# Patient Record
Sex: FEMALE | Employment: UNEMPLOYED | URBAN - METROPOLITAN AREA
[De-identification: names, ages, dates, MRNs, and addresses within clinical notes are randomized per-mention and may not be internally consistent; named-entity substitution may affect disease eponyms.]

---

## 2019-02-11 ENCOUNTER — OFFICE VISIT (OUTPATIENT)
Dept: FAMILY MEDICINE CLINIC | Age: 54
End: 2019-02-11

## 2019-02-11 VITALS
RESPIRATION RATE: 18 BRPM | OXYGEN SATURATION: 95 % | SYSTOLIC BLOOD PRESSURE: 137 MMHG | BODY MASS INDEX: 30.43 KG/M2 | TEMPERATURE: 98.4 F | DIASTOLIC BLOOD PRESSURE: 76 MMHG | HEIGHT: 60 IN | HEART RATE: 76 BPM | WEIGHT: 155 LBS

## 2019-02-11 DIAGNOSIS — E11.9 CONTROLLED TYPE 2 DIABETES MELLITUS WITHOUT COMPLICATION, WITH LONG-TERM CURRENT USE OF INSULIN (HCC): ICD-10-CM

## 2019-02-11 DIAGNOSIS — E78.00 HYPERCHOLESTEREMIA: ICD-10-CM

## 2019-02-11 DIAGNOSIS — I10 ESSENTIAL HYPERTENSION: Primary | ICD-10-CM

## 2019-02-11 DIAGNOSIS — Z79.4 CONTROLLED TYPE 2 DIABETES MELLITUS WITHOUT COMPLICATION, WITH LONG-TERM CURRENT USE OF INSULIN (HCC): ICD-10-CM

## 2019-02-11 DIAGNOSIS — J30.9 ALLERGIC RHINITIS, UNSPECIFIED SEASONALITY, UNSPECIFIED TRIGGER: ICD-10-CM

## 2019-02-11 RX ORDER — GABAPENTIN 300 MG/1
300 CAPSULE ORAL 2 TIMES DAILY
Qty: 60 CAP | Refills: 2 | Status: SHIPPED | OUTPATIENT
Start: 2019-02-11 | End: 2019-05-10 | Stop reason: SDUPTHER

## 2019-02-11 RX ORDER — LISINOPRIL 20 MG/1
20 TABLET ORAL DAILY
Qty: 30 TAB | Refills: 5 | Status: SHIPPED | OUTPATIENT
Start: 2019-02-11 | End: 2019-09-08 | Stop reason: SDUPTHER

## 2019-02-11 RX ORDER — ATORVASTATIN CALCIUM 10 MG/1
10 TABLET, FILM COATED ORAL DAILY
Qty: 30 TAB | Refills: 5 | Status: SHIPPED | OUTPATIENT
Start: 2019-02-11 | End: 2019-08-09 | Stop reason: SDUPTHER

## 2019-02-11 RX ORDER — ATORVASTATIN CALCIUM 10 MG/1
TABLET, FILM COATED ORAL DAILY
COMMUNITY
End: 2019-02-11 | Stop reason: SDUPTHER

## 2019-02-11 RX ORDER — INSULIN GLARGINE 100 [IU]/ML
50 INJECTION, SOLUTION SUBCUTANEOUS DAILY
COMMUNITY
End: 2019-02-11 | Stop reason: CLARIF

## 2019-02-11 RX ORDER — LISINOPRIL 20 MG/1
TABLET ORAL DAILY
COMMUNITY
End: 2019-02-11 | Stop reason: SDUPTHER

## 2019-02-11 RX ORDER — INSULIN GLARGINE 100 [IU]/ML
INJECTION, SOLUTION SUBCUTANEOUS
Qty: 5 PEN | Refills: 5 | Status: SHIPPED | OUTPATIENT
Start: 2019-02-11 | End: 2019-04-10 | Stop reason: SDUPTHER

## 2019-02-11 RX ORDER — CETIRIZINE HCL 10 MG
10 TABLET ORAL DAILY
Qty: 30 TAB | Refills: 5 | Status: SHIPPED | OUTPATIENT
Start: 2019-02-11 | End: 2022-02-04 | Stop reason: SDUPTHER

## 2019-02-11 NOTE — PROGRESS NOTES
HISTORY OF PRESENT ILLNESS  Kina Pacheco is a 48 y.o. female. HPI  Pt in office today to establish care - she does not speak [place] and sister is here as   Pt has concerns about back pain  Pt has concerns about headaches that are bad when her allergies  Pt wants to know if she can get something for the headache and back pain  She used to take zyrtec for allergies, all secretions are clear  -pt used to take percocet       She is also here for follow up chol, htn and DM  She is checking her sugar and 200-300 range is common  Only on lantus - metformin caused diarrhea  invokana gave her terrible yeast infections    The FamHx, SocHx, MedHx, Medication, and Allergy lists have been reviewed and updated in the chart. ROS  A comprehensive review of system was obtained and negative except findings in the HPI    Visit Vitals  /76 (BP 1 Location: Right arm, BP Patient Position: Sitting)   Pulse 76   Temp 98.4 °F (36.9 °C) (Oral)   Resp 18   Ht 5' (1.524 m)   Wt 155 lb (70.3 kg)   SpO2 95%   BMI 30.27 kg/m²     Physical Exam   Constitutional: She is oriented to person, place, and time. She appears well-developed and well-nourished. Neck: No JVD present. Cardiovascular: Normal rate, regular rhythm and intact distal pulses. Exam reveals no gallop and no friction rub. No murmur heard. Pulmonary/Chest: Effort normal and breath sounds normal. No respiratory distress. She has no wheezes. Musculoskeletal: She exhibits no edema. Neurological: She is alert and oriented to person, place, and time. Skin: Skin is warm. Nursing note and vitals reviewed. ASSESSMENT and PLAN  Encounter Diagnoses   Name Primary?     Essential hypertension Yes    Hypercholesteremia     Controlled type 2 diabetes mellitus without complication, with long-term current use of insulin (HCC)     Allergic rhinitis, unspecified seasonality, unspecified trigger      Orders Placed This Encounter    LIPID PANEL    CBC WITH AUTOMATED DIFF    METABOLIC PANEL, COMPREHENSIVE    HEMOGLOBIN A1C WITH EAG    MICROALBUMIN, UR, RAND W/ MICROALB/CREAT RATIO    ALBUTEROL SULFATE PO      mometasone-formoterol (DULERA) 200-5 mcg/actuation HFA inhaler    lisinopril (PRINIVIL, ZESTRIL) 20 mg tablet    atorvastatin (LIPITOR) 10 mg tablet    SITagliptin (JANUVIA) 100 mg tablet    insulin glargine (LANTUS,BASAGLAR) 100 unit/mL (3 mL) inpn    cetirizine (ZYRTEC) 10 mg tablet    gabapentin (NEURONTIN) 300 mg capsule     All refills updated  Given gabapentin for pain bid with food  Restart zyrtec  Also given Januvia  Recheck bs log in 4 weeks    I have discussed the diagnosis with the patient and the intended plan as seen in the above orders. The patient has received an after-visit summary and questions were answered concerning future plans. Patient conveyed understanding of the plan at the time of the visit.     Ruth Bhat, MSN, ANP  2/11/2019

## 2019-02-11 NOTE — PROGRESS NOTES
Chief Complaint   Patient presents with   [address]     Pt in office today to establish care  Pt has concerns about back pain  Pt has concerns about headaches that are bad when her allergies  Pt wants to know if she can get something for the headache and back pain  -pt used to take percocet     Pt has no other concerns

## 2019-02-12 LAB
ALBUMIN SERPL-MCNC: 4.4 G/DL (ref 3.5–5.5)
ALBUMIN/CREAT UR: 18 MG/G CREAT (ref 0–30)
ALBUMIN/GLOB SERPL: 1.4 {RATIO} (ref 1.2–2.2)
ALP SERPL-CCNC: 188 IU/L (ref 39–117)
ALT SERPL-CCNC: 64 IU/L (ref 0–32)
AST SERPL-CCNC: 38 IU/L (ref 0–40)
BASOPHILS # BLD AUTO: 0 X10E3/UL (ref 0–0.2)
BASOPHILS NFR BLD AUTO: 0 %
BILIRUB SERPL-MCNC: 1.5 MG/DL (ref 0–1.2)
BUN SERPL-MCNC: 11 MG/DL (ref 6–24)
BUN/CREAT SERPL: 15 (ref 9–23)
CALCIUM SERPL-MCNC: 10.2 MG/DL (ref 8.7–10.2)
CHLORIDE SERPL-SCNC: 100 MMOL/L (ref 96–106)
CHOLEST SERPL-MCNC: 204 MG/DL (ref 100–199)
CO2 SERPL-SCNC: 24 MMOL/L (ref 20–29)
CREAT SERPL-MCNC: 0.73 MG/DL (ref 0.57–1)
CREAT UR-MCNC: 55.5 MG/DL
EOSINOPHIL # BLD AUTO: 0.3 X10E3/UL (ref 0–0.4)
EOSINOPHIL NFR BLD AUTO: 3 %
ERYTHROCYTE [DISTWIDTH] IN BLOOD BY AUTOMATED COUNT: 13.8 % (ref 12.3–15.4)
EST. AVERAGE GLUCOSE BLD GHB EST-MCNC: 229 MG/DL
GLOBULIN SER CALC-MCNC: 3.1 G/DL (ref 1.5–4.5)
GLUCOSE SERPL-MCNC: 335 MG/DL (ref 65–99)
HBA1C MFR BLD: 9.6 % (ref 4.8–5.6)
HCT VFR BLD AUTO: 40.6 % (ref 34–46.6)
HDLC SERPL-MCNC: 49 MG/DL
HGB BLD-MCNC: 13.8 G/DL (ref 11.1–15.9)
IMM GRANULOCYTES # BLD AUTO: 0 X10E3/UL (ref 0–0.1)
IMM GRANULOCYTES NFR BLD AUTO: 0 %
INTERPRETATION, 910389: NORMAL
LDLC SERPL CALC-MCNC: 108 MG/DL (ref 0–99)
LYMPHOCYTES # BLD AUTO: 3.4 X10E3/UL (ref 0.7–3.1)
LYMPHOCYTES NFR BLD AUTO: 37 %
Lab: NORMAL
MCH RBC QN AUTO: 28.2 PG (ref 26.6–33)
MCHC RBC AUTO-ENTMCNC: 34 G/DL (ref 31.5–35.7)
MCV RBC AUTO: 83 FL (ref 79–97)
MICROALBUMIN UR-MCNC: 10 UG/ML
MONOCYTES # BLD AUTO: 0.4 X10E3/UL (ref 0.1–0.9)
MONOCYTES NFR BLD AUTO: 4 %
NEUTROPHILS # BLD AUTO: 4.9 X10E3/UL (ref 1.4–7)
NEUTROPHILS NFR BLD AUTO: 56 %
PLATELET # BLD AUTO: 277 X10E3/UL (ref 150–379)
POTASSIUM SERPL-SCNC: 4.3 MMOL/L (ref 3.5–5.2)
PROT SERPL-MCNC: 7.5 G/DL (ref 6–8.5)
RBC # BLD AUTO: 4.89 X10E6/UL (ref 3.77–5.28)
SODIUM SERPL-SCNC: 139 MMOL/L (ref 134–144)
TRIGL SERPL-MCNC: 234 MG/DL (ref 0–149)
VLDLC SERPL CALC-MCNC: 47 MG/DL (ref 5–40)
WBC # BLD AUTO: 9 X10E3/UL (ref 3.4–10.8)

## 2019-02-12 NOTE — PROGRESS NOTES
Please let her know that her labs are all quite high for sugar and cholesterol. Start the Januvia as prescribed and recheck labs in 3 months.  [facility], [place]

## 2019-02-13 NOTE — PROGRESS NOTES
Spoke with pt  David Butts (on hippa) and informed him of his wifes results. David Butts verbalized understanding and stated he would pass the message along to her and to recheck in 3months.

## 2019-04-10 ENCOUNTER — TELEPHONE (OUTPATIENT)
Dept: FAMILY MEDICINE CLINIC | Age: 54
End: 2019-04-10

## 2019-04-10 RX ORDER — INSULIN GLARGINE 100 [IU]/ML
INJECTION, SOLUTION SUBCUTANEOUS
Qty: 5 PEN | Refills: 5 | Status: SHIPPED | OUTPATIENT
Start: 2019-04-10 | End: 2022-02-04 | Stop reason: SDUPTHER

## 2019-04-10 NOTE — TELEPHONE ENCOUNTER
Pt is out of insulin and will be traveling to [facility] 80 states she has tried reaching out to [employer]

## 2019-04-10 NOTE — TELEPHONE ENCOUNTER
----- Message from Yeni Andrade sent at 4/10/2019 10:55 AM EDT -----  Regarding: Dr. Gerber Adan Refill  Patient states she has already contacted [facility] pharmacy (already on file) to fax over a refill request for her insulin but has received no response. Patient states she is currently out of insulin. Pt's best contact number is [phone].

## 2019-05-10 RX ORDER — GABAPENTIN 300 MG/1
300 CAPSULE ORAL 2 TIMES DAILY
Qty: 60 CAP | Refills: 2 | Status: SHIPPED | OUTPATIENT
Start: 2019-05-10 | End: 2021-11-19

## 2019-08-09 RX ORDER — ATORVASTATIN CALCIUM 10 MG/1
TABLET, FILM COATED ORAL
Qty: 30 TAB | Refills: 5 | Status: SHIPPED | OUTPATIENT
Start: 2019-08-09 | End: 2021-12-24 | Stop reason: SDUPTHER

## 2019-09-09 RX ORDER — LISINOPRIL 20 MG/1
TABLET ORAL
Qty: 30 TAB | Refills: 5 | Status: SHIPPED | OUTPATIENT
Start: 2019-09-09 | End: 2021-11-01 | Stop reason: SDUPTHER

## 2021-06-11 ENCOUNTER — OFFICE VISIT (OUTPATIENT)
Dept: FAMILY MEDICINE CLINIC | Age: 56
End: 2021-06-11
Payer: COMMERCIAL

## 2021-06-11 VITALS
HEIGHT: 61 IN | OXYGEN SATURATION: 97 % | WEIGHT: 150 LBS | HEART RATE: 70 BPM | BODY MASS INDEX: 28.32 KG/M2 | TEMPERATURE: 98.1 F | DIASTOLIC BLOOD PRESSURE: 71 MMHG | RESPIRATION RATE: 18 BRPM | SYSTOLIC BLOOD PRESSURE: 110 MMHG

## 2021-06-11 DIAGNOSIS — Z79.4 TYPE 2 DIABETES MELLITUS WITHOUT COMPLICATION, WITH LONG-TERM CURRENT USE OF INSULIN (HCC): Primary | ICD-10-CM

## 2021-06-11 DIAGNOSIS — M79.602 LEFT ARM PAIN: ICD-10-CM

## 2021-06-11 DIAGNOSIS — I10 ESSENTIAL HYPERTENSION: ICD-10-CM

## 2021-06-11 DIAGNOSIS — G56.02 CARPAL TUNNEL SYNDROME, LEFT: ICD-10-CM

## 2021-06-11 DIAGNOSIS — E11.9 TYPE 2 DIABETES MELLITUS WITHOUT COMPLICATION, WITH LONG-TERM CURRENT USE OF INSULIN (HCC): Primary | ICD-10-CM

## 2021-06-11 PROCEDURE — 99204 OFFICE O/P NEW MOD 45 MIN: CPT | Performed by: NURSE PRACTITIONER

## 2021-06-11 RX ORDER — GABAPENTIN 300 MG/1
CAPSULE ORAL
Qty: 90 CAPSULE | Refills: 1 | Status: SHIPPED | OUTPATIENT
Start: 2021-06-11 | End: 2021-07-16

## 2021-06-11 RX ORDER — LISINOPRIL 20 MG/1
TABLET ORAL DAILY
COMMUNITY
End: 2021-07-16

## 2021-06-11 RX ORDER — BUTALBITAL, ASPIRIN AND CAFFEINE 50; 325; 40 MG/1; MG/1; MG/1
1 TABLET ORAL
COMMUNITY
End: 2021-07-16

## 2021-06-11 RX ORDER — MONTELUKAST SODIUM 10 MG/1
10 TABLET ORAL DAILY
COMMUNITY
End: 2021-11-19

## 2021-06-11 RX ORDER — PREDNISONE 10 MG/1
TABLET ORAL
Qty: 21 TABLET | Refills: 0 | Status: SHIPPED | OUTPATIENT
Start: 2021-06-11 | End: 2021-07-16

## 2021-06-11 RX ORDER — CETIRIZINE HCL 10 MG
TABLET ORAL
COMMUNITY
End: 2021-07-16

## 2021-06-11 RX ORDER — INSULIN GLARGINE 100 [IU]/ML
INJECTION, SOLUTION SUBCUTANEOUS
COMMUNITY
End: 2021-07-16

## 2021-06-11 RX ORDER — IPRATROPIUM BROMIDE 21 UG/1
2 SPRAY, METERED NASAL EVERY 12 HOURS
COMMUNITY
End: 2021-11-19

## 2021-06-11 RX ORDER — DULAGLUTIDE 0.75 MG/.5ML
0.75 INJECTION, SOLUTION SUBCUTANEOUS
COMMUNITY
End: 2021-07-02 | Stop reason: SDUPTHER

## 2021-06-11 RX ORDER — FLUTICASONE PROPIONATE 50 MCG
2 SPRAY, SUSPENSION (ML) NASAL DAILY
COMMUNITY

## 2021-06-11 RX ORDER — ALBUTEROL SULFATE 90 UG/1
AEROSOL, METERED RESPIRATORY (INHALATION)
COMMUNITY

## 2021-06-11 RX ORDER — TOPIRAMATE 50 MG/1
TABLET, FILM COATED ORAL 2 TIMES DAILY
COMMUNITY
End: 2021-07-16

## 2021-06-11 NOTE — PROGRESS NOTES
Chief Complaint   Patient presents with   [address] [facility]    Patient in office today to [facility]. Pt previous pcp was Lindsey Finn in [place]      Patient have c/o of left shoulder pain that began in December after covid. Pt states pain is a shooting and stabbing sensation,will radiate down arm. Denies injury or heavy lifting. Pt have noted limited ROM-only able to lift arm at a certain height. Have been treating with Advil and Tylenol-with no relief noted. 1. Have you been to the ER, urgent care clinic since your last visit? Hospitalized since your last visit? No    2. Have you seen or consulted any other health care providers outside of the [address] [place] [address] since your last visit? Include any pap smears or colon screening.  No

## 2021-06-11 NOTE — PATIENT INSTRUCTIONS
Síndrome del sia carpuzma: Instrucciones de cuidado  Carpal Tunnel Syndrome: Care Instructions  Instrucciones de cuidado    El síndrome del sia carpuzma es un problema nervioso. Puede causar hormigueo, entumecimiento, debilidad o [employer] dedos, el pulgar y la [place]. El nervio mediano y varios tejidos fibrosos, llamados tendones, atraviesan la nicolás por un espacio denominado sia carpiano. El movimiento repetido de las hitesh al trabajar o practicar ciertos pasatiempos y deportes puede hacer presión sobre el nervio. El embarazo y ciertas afecciones médicas, cabrera la diabetes, la artritis y Tonye Rubi tiroides hipoactiva, también pueden causar el síndrome del sia carpuzma. Para reducir los síntomas, usted podría limitar [place] o [facility] Center[facility] de [place]. También puede kaden otras medidas para sentirse mejor. Si los síntomas son leves, es probable que pueda aliviar el dolor con 1 o 2 semanas de tratamiento en el [facility]. La cirugía es necesaria solo si otros tratamientos no funcionan. La atención de seguimiento es romain parte clave de hayward tratamiento y seguridad. Asegúrese de hacer y acudir a todas las citas, y llame a hayward médico si está teniendo problemas. También es romain buena idea saber los resultados de nikolay exámenes y mantener romain lista de los medicamentos que marck. Cómo puede cuidarse en el hogar? · Si es posible, interrumpa o disminuya la actividad que causa los síntomas. Si no puede suspenderla, eladia pausas frecuentes para descansar y estirarse o cambie la posición de las hitesh para hacer romain tarea. Trate de [place] [facility], cabrera cuando [place] el ratón de romain computadora. · Trate de evitar doblar o rotar las muñecas. · Pregúntele a hayward médico si puede kaden un analgésico (medicamento para el dolor) de venta bekah, cabrera acetaminofén (Tylenol), ibuprofeno (Advil, Motrin) o naproxeno (Aleve). Sea vidhi con los medicamentos. Chloe y siga todas las instrucciones de la [place].   · Si hayward médico le receta corticosteroides para ayudar a aliviar el dolor y reducir la hinchazón, tómelos exactamente cabrera le fueron recetados. Llame a hayward médico si conrad estar teniendo problemas con hayward medicamento. · Colóquese hielo o ormain compresa fría sobre la [place] de 10 a 20 minutos cada vez para aliviar el dolor. Póngase un paño vallejo entre el hielo y la piel. · Si hayward médico o hayward fisioterapeuta o terapeuta ocupacional le indica que use romain tablilla (férula) para la [place], [place] según las indicaciones para mantener la [place] en romain posición neutra. Leona también reduce la presión sobre hayward nervio mediano. · Pregúntele a hayward médico si debería hacer sesiones de fisioterapia o de terapia ocupacional para aprender a hacer las tareas de [employer]. · [place] clases de yoga para estirar los músculos y fortalecer las hitesh y las [place]. El yoga ha [employer] síntomas del túnel carpiano. Cómo prevenir el síndrome del túnel carpiano  · Cuando trabaje en la computadora, Carie Valdez [address] y las muñecas alineadas con los antebrazos. Mantenga los codos cerca de nikolay costados. [place] un descanso con romain frecuencia de 10 a 15 minutos. · Trate de hacer los siguientes ejercicios:  ? Calentamiento: Gire la [place], Selena Adeola y de un lado a otro. Repita esto 4 veces. Estire [employer] dedos, relájelos y vuelva a estirarlos. Repita 4 veces. Estire el pulgar doblándolo levemente hacia atrás, manténgalo en aye posición y relájelo. Repita 4 veces. ? Estiramiento con los [employer] en posición de orar: Comience colocando las toya de las hitesh juntas racquel del [place], jesusita debajo del [place]. Baje las hitesh lentamente hacia la línea de la cintura y manténgalas cerca del estómago con las toya juntas hasta que sienta un estiramiento leve a moderado debajo de los antebrazos. Mantenga la posición entre 10 y 21 segundos. Repita 4 veces. ? Estiramiento del flexor de la nicolás: Extienda el Jose Levans frente a usted, con la taveras [place] arriba. Doble la nicolás y apunte con la mano hacia el piso. Con la [employer], doble con suavidad la nicolás aún más hasta que sienta un estiramiento entre leve y moderado en el antebrazo. Mantenga la posición entre 10 y 21 segundos. Repita 4 veces. ? Estiramiento del extensor de la nicolás: Repita los pasos para el estiramiento del flexor de la nicolás sina comience con la taveras extendida Iris Rafael. · Apriete romain pelota de goma varias veces al día para mantener marcelina las hitesh y los dedos. · Evite sostener objetos (cabrera un libro) en la misma posición sravanthi mucho tiempo. Siempre que sea posible, utilice toda la mano para kaden un objeto. Si [place] solo el pulgar y el dedo índice puede tensionar la [place]. · No fume. Puede empeorar esta afección ya que reduce el flujo de darell hacia el nervio El paso. Si necesita ayuda para dejar de fumar, hable con hayward médico sobre programas y medicamentos para dejar de fumar. Estos pueden aumentar nikolay probabilidades de dejar el hábito para siempre. Cuándo debe pedir ayuda? Preste especial atención a los cambios de hayward yoni y asegúrese de comunicarse con hayward médico si:    · El dolor u otros problemas no mejoran con los cuidados en el hogar.     · Desea más información sobre la fisioterapia o la terapia ocupacional.     · Tiene efectos secundarios producidos por los corticosteroides, tales cabrera:  ? Aumento de [place]. ? Cambios en el estado de ánimo. ? Problemas para dormir. ? Fácil formación de moretones.     · Tiene otros problemas con los medicamentos. Dónde puede encontrar más información en inglés? Vaya a [email or web address]  Micha R432 en la búsqueda para aprender más acerca de \"Síndrome del sia walls: Instrucciones de cuidado. \"  Revisado: 16 noviembre, 2020               Versión del contenido: 12.8  © 7222-8382 Healthwise, Incorporated.    Las instrucciones de cuidado fueron adaptadas bajo licencia por [employer] (which disclaims liability or warranty for this information). Si usted tiene [place] afección médica o sobre estas instrucciones, siempre pregunte a guerra profesional de yoni. [employer] niega toda garantía o responsabilidad por guerra uso de esta información. Síndrome del sia walls: Ejercicios  Carpal Tunnel Syndrome: Exercises  Instrucciones de cuidado  Aquí se presentan algunos ejemplos de ejercicios típicos de rehabilitación para tratar guerra afección. Empiece cada ejercicio lentamente. Reduzca la intensidad del ejercicio si Conchis Leys a tener dolor. Guerra médico o guerra fisioterapeuta o terapeuta ocupacional le dirá cuándo puede comenzar con estos ejercicios y cuáles funcionarán mejor para usted. Estiramientos de calentamiento  Nota: Cuando ya no tenga dolor o entumecimiento, puede hacer ejercicios para ayudar a prevenir que vuelva el síndrome del sia walls. No eladia ningún estiramiento o movimiento que sea incómodo o doloroso. Estiramientos de calentamiento  · Gire la nicolás hacia arriba, [place] y de un lado a otro. Repita 4 veces. · Estire los dedos separándolos. Relájelos y luego vuelva a estirarlos. Repita 4 veces. · Estire el pulgar doblándolo levemente hacia atrás, manténgalo en aye posición y relájelo. Repita 4 veces. Cómo hacer los ejercicios  Estiramiento con los brazos en posición de orar   1. Comience colocando las toya de las hitesh juntas racquel del [place], jesusita debajo del [place]. 2. Baje las hitesh lentamente hacia la línea de la cintura y manténgalas cerca del estómago con las toya juntas hasta que sienta un estiramiento leve a moderado debajo de los antebrazos. 3. Sosténgalo por lo menos de 15 a 30 segundos. Repita de 2 a 4 veces. Estiramiento del flexor de la nicolás   1. Extienda el brazo racquel de usted con la taveras [place] arriba. 2. Doble la nicolás y apunte con la mano hacia el piso.   3. Con la otra mano, doble con suavidad la nicolás aún más hasta que sienta un estiramiento entre leve y [employer]. 4. Sosténgalo por lo menos de 15 a 30 segundos. Repita de 2 a 4 veces. Estiramiento del extensor de la nicolás   1. Repita los pasos del 1 al 4 del estiramiento anterior, sina comience con la taveras de la mano extendida [place]. La atención de seguimiento es romain parte clave de hayward tratamiento y seguridad. Asegúrese de hacer y acudir a todas las citas, y llame a hayward médico si está teniendo problemas. También es romain buena idea saber los resultados de nikolay exámenes y mantener romain lista de los medicamentos que marck. Dónde puede encontrar más información en inglés? Poonam Kenney a [email or web address]  Matti Arroyo D162 en la búsqueda para aprender más acerca de \"Síndrome del sia walls: Ejercicios. \"  Revisado: 16 noviembre, 2020               Versión del contenido: 12.8  © 1686-1415 Healthwise, [employer]. Las instrucciones de cuidado fueron adaptadas bajo licencia por [employer] (which disclaims liability or warranty for this information). Si usted tiene [place] afección médica o sobre estas instrucciones, siempre pregunte a hayward profesional de yoni. [employer], [employer] niega toda garantía o responsabilidad por hayward uso de esta información.

## 2021-06-11 NOTE — PROGRESS NOTES
Chief Complaint   Patient presents with   [address]    Patient in office today to [facility]. Pt previous pcp was Dr. Joan Ferreira in [place]. Last saw in January. Last hemoglobin a1c check was in January and it was 9.5. She increased the basaglar to 68 and started pt on trulicity once weekly. Checking BG at home. Depends on what she eats. Sometimes doesn't require as much of the insulin. Was noticing some low readings with the higher dose. Checking every morning and readings usually good. This morning was 88. Last meal was just prior to appt. Patient have c/o of left shoulder pain that began in December after covid. PCP recommended yoga which was not helping. Pain is constant. Decreased ROM due to pain. Pointing to the left shoulder and radiates down to the fingers. Has associated numbness and tingling in the fingers. Does have some associated neck pain. Pt states pain is a shooting and stabbing sensation, will radiate down arm. Denies injury or heavy lifting. Pt have noted limited ROM-only able to lift arm at a certain height. Have been treating with Advil and Tylenol-with no relief noted. Has also applied heat without relief. Was hospitalized for COVID for 6 days. Pt denies any of these sx prior to COVID. Denies any intubation. Just treated for pneumonia any hypoxia. Tried using a muscle relaxer (not flexeril) that didn't help and made her drowsy. Denies any other concerns at this time. Chief Complaint   Patient presents with   [address]     she is a 50y.o. year old female who presents for evalution. Reviewed PmHx, RxHx, FmHx, SocHx, AllgHx and updated and dated in the chart.     Review of Systems - negative except as listed above in the HPI    Objective:     Vitals:    06/11/21 1349   BP: 110/71   Pulse: 70   Resp: 18   Temp: 98.1 °F (36.7 °C)   TempSrc: Oral   SpO2: 97%   Weight: 150 lb (68 kg)   Height: 5' 0.83\" (1.545 m)     Physical Examination: General appearance - alert, well appearing, and in no distress  Mental status - normal mood, behavior, speech, dress, motor activity, and thought processes  Eyes - pupils equal and reactive, extraocular eye movements intact  Ears - bilateral TM's and external ear canals normal  Nose - normal and patent, no erythema, discharge or polyps and normal nontender sinuses  Mouth - mucous membranes moist, pharynx normal without lesions  Neck - supple, no significant adenopathy, carotids upstroke normal bilaterally, no bruits, thyroid exam: thyroid is normal in size without nodules or tenderness  Chest - clear to auscultation, no wheezes, rales or rhonchi, symmetric air entry  Heart - normal rate, regular rhythm, normal S1, S2, no murmurs  Musculoskeletal - neck ROM intact, abnormal exam of left shoulder, reports pain with all ROM that radiates from shoulder down to arm  Positive tinnels and phalens in left wrist  Extremities - peripheral pulses normal, no ankle edema, no clubbing or cyanosis  Skin - normal coloration and turgor, no rashes, no suspicious skin lesions noted    Assessment/ Plan:   Diagnoses and all orders for this visit:    1. Type 2 diabetes mellitus without complication, with long-term current use of insulin ([facility])  -     HEMOGLOBIN A1C WITH EAG; Future  -     METABOLIC PANEL, COMPREHENSIVE; Future  -     CBC WITH AUTOMATED DIFF; Future  Will notify results and deviate plan based on findings. Will request records from previous PCP to review. 2. Essential hypertension  BP at goal on med regimen. 3. Left arm pain / 4. Carpal tunnel syndrome, left  -     gabapentin (NEURONTIN) 300 mg capsule; Take 2 caps by mouth in the evening and 1 cap by mouth in the morning.  -     predniSONE (STERAPRED DS) 10 mg dose pack; See administration instruction per 10mg dose pack  Recommended trial of medication. Complete pred taper as prescribed. Low carb diet and lots of water.  If sugars become elevated, okay to take extra basaglar to cover. Call if assistance needed troubleshooting but pt seemed confident that she can manage. Gabapentin as directed. Reviewed SEs/ADRs of medication. Reviewed diagnosis and recommended stretches. Follow up in 4 weeks to recheck. If sx persist or worsen will order xray of C spine and shoulder for further eval.      Follow-up and Dispositions    · Return in about 4 weeks (around 7/9/2021) for follow up. I have discussed the diagnosis with the patient and the intended plan as seen in the above orders. The patient has received an after-visit summary and questions were answered concerning future plans. Medication Side Effects and Warnings were discussed with patient: yes  Patient Labs were reviewed and or requested: yes  Patient Past Records were reviewed and or requested  yes  Patient / Caregiver Understanding of treatment plan was verbalized during office visit TRAY Nazario-ERLIN    Patient Instructions          Síndrome del túnel carpiano: Instrucciones de cuidado  Carpal Tunnel Syndrome: Care Instructions  Instrucciones de cuidado    El síndrome del túnel carpiano es un problema nervioso. Puede causar hormigueo, entumecimiento, debilidad o [employer] dedos, el pulgar y la [place]. El nervio mediano y varios tejidos fibrosos, llamados tendones, atraviesan la nicolás por un espacio denominado túnel carpiano. El movimiento repetido de las hitesh al trabajar o practicar ciertos pasatiempos y deportes puede hacer presión sobre el nervio. El embarazo y ciertas afecciones médicas, cabrera la diabetes, la artritis y Eder Saldana tiroides hipoactiva, también pueden causar el síndrome del túnel carpiano. Para reducir los síntomas, usted podría limitar [facility] o [facility]. También puede kaden otras medidas para sentirse mejor. Si los síntomas son leves, es probable que pueda aliviar el dolor con 1 o 2 semanas de tratamiento en el hogar.  La cirugía es necesaria solo si otros tratamientos no funcionan. La atención de seguimiento es romain parte clave de hayward tratamiento y seguridad. Asegúrese de hacer y acudir a todas las citas, y llame a hayward médico si está teniendo problemas. También es romain buena idea saber los resultados de nikolay exámenes y mantener romain lista de los medicamentos que marck. ¿Cómo puede cuidarse en el hogar? · Si es posible, interrumpa o disminuya la actividad que causa los síntomas. Si no puede suspenderla, eladia pausas frecuentes para descansar y estirarse o cambie la posición de las hitesh para hacer romain tarea. Trate de [place] [facility], cabrera cuando [place] el ratón de romain computadora. · Trate de evitar doblar o rotar las muñecas. · Pregúntele a hayward médico si puede kaden un analgésico (medicamento para el dolor) de venta bekah, cabrera acetaminofén (Tylenol), ibuprofeno (Advil, Motrin) o naproxeno (Aleve). Sea vidhi con los medicamentos. Chloe y siga todas las instrucciones de la [place]. · Si hayward médico le receta corticosteroides para ayudar a aliviar el dolor y reducir la hinchazón, tómelos exactamente cabrera le fueron recetados. Llame a hayward médico si conrad estar teniendo problemas con hayward medicamento. · Colóquese hielo o romain compresa fría sobre la [place] de 10 a 20 minutos cada vez para aliviar el dolor. Póngase un paño vallejo entre el hielo y la piel. · Si hayward médico o hayward fisioterapeuta o terapeuta ocupacional le indica que use romain tablilla (férula) para la [place], [place] según las indicaciones para mantener la [place] en romain posición neutra. [place] también reduce la presión sobre hayward nervio mediano. · Pregúntele a hayward médico si debería hacer sesiones de fisioterapia o de terapia ocupacional para aprender a hacer las tareas de [employer]. · Hoople clases de yoga para estirar los músculos y fortalecer las hitesh y las Mad[place]. El yoga ha [employer] síntomas del sia walls.   Cómo prevenir el síndrome del túnel titi  · Cuando trabaje en la computadora, Gabriella. [address] y las muñecas alineadas con los antebrazos. Mantenga los codos cerca de nikolay costados. [place] un descanso con romain frecuencia de 10 a 15 minutos. · Trate de hacer los siguientes ejercicios:  ? Calentamiento: Gire la [place], Carmen Alden y de un lado a otro. Repita esto 4 veces. Estire [employer] dedos, relájelos y vuelva a estirarlos. Repita 4 veces. Estire el pulgar doblándolo levemente hacia atrás, manténgalo en aye posición y relájelo. Repita 4 veces. ? Estiramiento con los [employer] en posición de orar: Comience colocando las toya de las hitesh juntas racquel del [place], jesusita debajo del [place]. Baje las hitesh lentamente hacia la línea de la cintura y manténgalas cerca del estómago con las toya juntas hasta que sienta un estiramiento leve a moderado debajo de los antebrazos. Mantenga la posición entre 10 y 21 segundos. Repita 4 veces. ? Estiramiento del flexor de la nicolás: Extienda el Connor Grippe frente a usted, con la taveras [place] arriba. Doble la nicolás y apunte con la mano hacia el piso. Con la [employer], doble con suavidad la nicolás aún más hasta que sienta un estiramiento entre leve y moderado en el antebrazo. Mantenga la posición entre 10 y 21 segundos. Repita 4 veces. ? Estiramiento del extensor de la nicolás: Repita los pasos para el estiramiento del flexor de la nicolás sina comience con la taveras extendida Carmen Alden. · Apriete romain pelota de goma varias veces al día para mantener marcelina las hitesh y los dedos. · Evite sostener objetos (cabrera un libro) en la misma posición sravanthi mucho tiempo. Siempre que sea posible, utilice toda la mano para kaden un objeto. Si [place] solo el pulgar y el dedo índice puede tensionar la [place]. · No fume. Puede empeorar esta afección ya que reduce el flujo de darell hacia el nervio El paso. Si necesita ayuda para dejar de fumar, hable con hayward médico sobre programas y medicamentos para dejar de fumar.  Estos pueden aumentar nikolay probabilidades de dejar el hábito para siempre. ¿Cuándo debe pedir ayuda? Preste especial atención a los cambios de hayward yoni y asegúrese de comunicarse con hayward médico si:    · El dolor u otros problemas no mejoran con los cuidados en el hogar.     · Desea más información sobre la fisioterapia o la terapia ocupacional.     · Tiene efectos secundarios producidos por los corticosteroides, tales cabrera:  ? Aumento de [place]. ? Cambios en el estado de ánimo. ? Problemas para dormir. ? Fácil formación de moretones.     · Tiene otros problemas con los medicamentos. ¿Dónde puede encontrar más información en inglés? Vaya a http://www.gray.com/  Micha R432 en la búsqueda para aprender más acerca de \"Síndrome del túnel carpiano: Instrucciones de cuidado. \"  Revisado: 16 noviembre, 2020               Versión del contenido: 12.8  © 2102-8524 Healthwise, Incorporated. Las instrucciones de cuidado fueron adaptadas bajo licencia por [employer] (which disclaims liability or warranty for this information). Si usted tiene [place] afección médica o sobre estas instrucciones, siempre pregunte a hayward profesional de yoni. Healthwise, Incorporated niega toda garantía o responsabilidad por hayward uso de esta información. Síndrome del túnel carpiano: Ejercicios  Carpal Tunnel Syndrome: Exercises  Instrucciones de cuidado  Aquí se presentan algunos ejemplos de ejercicios típicos de rehabilitación para tratar hayward afección. Empiece cada ejercicio lentamente. Reduzca la intensidad del ejercicio si Lashawn Daria a tener dolor. Hayward médico o hayward fisioterapeuta o terapeuta ocupacional le dirá cuándo puede comenzar con estos ejercicios y cuáles funcionarán mejor para usted. Estiramientos de calentamiento  Nota: Cuando ya no tenga dolor o entumecimiento, puede hacer ejercicios para ayudar a prevenir que vuelva el síndrome del túnel carpiano. No eladia ningún estiramiento o movimiento que sea incómodo o doloroso.   Estiramientos de calentamiento  · Gire la nicolás hacia arriba, [place] y de un lado a otro. Repita 4 veces. · Estire los dedos separándolos. Relájelos y luego vuelva a estirarlos. Repita 4 veces. · Estire el pulgar doblándolo levemente hacia atrás, manténgalo en aye posición y relájelo. Repita 4 veces. Cómo hacer los ejercicios  Estiramiento con los brazos en posición de orar   1. Comience colocando las toya de las hitesh juntas racquel del [place], jesusita debajo del [place]. 2. Baje las hitesh lentamente hacia la línea de la cintura y manténgalas cerca del estómago con las toya juntas hasta que sienta un estiramiento leve a moderado debajo de los antebrazos. 3. Sosténgalo por lo menos de 15 a 30 segundos. Repita de 2 a 4 veces. Estiramiento del flexor de la nicolás   1. Extienda el brazo racquel de usted con la taveras [place] arriba. 2. Doble la nicolás y apunte con la mano hacia el piso. 3. Con la otra mano, doble con suavidad la nicolás aún más hasta que sienta un estiramiento entre leve y moderado en el antebrazo. 4. Sosténgalo por lo menos de 15 a 30 segundos. Repita de 2 a 4 veces. Estiramiento del extensor de la nicolás   1. Repita los pasos del 1 al 4 del estiramiento anterior, sina comience con la taveras de la mano extendida [place] Ing. La atención de seguimiento es romain parte clave de hayward tratamiento y seguridad. Asegúrese de hacer y acudir a todas las citas, y llame a hayward médico si está teniendo problemas. También es romain buena idea saber los resultados de nikolay exámenes y mantener romain lista de los medicamentos que marck. ¿Dónde puede encontrar más información en inglés? Rachel Class a http://www.gray.com/  Micha R299 en la búsqueda para aprender más acerca de \"Síndrome del túnel carpiano: Ejercicios. \"  Revisado: 16 noviembre, 2020               Versión del contenido: 12.8  © 7282-5378 Healthwise, Incorporated.    Las instrucciones de cuidado fueron adaptadas bajo licencia por [employer] (which disclaims liability or warranty for this information). Si usted tiene [place] afección médica o sobre estas instrucciones, siempre pregunte a hayward profesional de yoni. [facility] niega toda garantía o responsabilidad por hayward uso de esta información.

## 2021-06-12 LAB
ALBUMIN SERPL-MCNC: 4.5 G/DL (ref 3.8–4.9)
ALBUMIN/GLOB SERPL: 1.7 {RATIO} (ref 1.2–2.2)
ALP SERPL-CCNC: 144 IU/L (ref 48–121)
ALT SERPL-CCNC: 41 IU/L (ref 0–32)
AST SERPL-CCNC: 24 IU/L (ref 0–40)
BASOPHILS # BLD AUTO: 0.1 X10E3/UL (ref 0–0.2)
BASOPHILS NFR BLD AUTO: 1 %
BILIRUB SERPL-MCNC: 1.8 MG/DL (ref 0–1.2)
BUN SERPL-MCNC: 12 MG/DL (ref 6–24)
BUN/CREAT SERPL: 19 (ref 9–23)
CALCIUM SERPL-MCNC: 9.6 MG/DL (ref 8.7–10.2)
CHLORIDE SERPL-SCNC: 103 MMOL/L (ref 96–106)
CO2 SERPL-SCNC: 22 MMOL/L (ref 20–29)
CREAT SERPL-MCNC: 0.64 MG/DL (ref 0.57–1)
EOSINOPHIL # BLD AUTO: 0.5 X10E3/UL (ref 0–0.4)
EOSINOPHIL NFR BLD AUTO: 5 %
ERYTHROCYTE [DISTWIDTH] IN BLOOD BY AUTOMATED COUNT: 13.3 % (ref 11.7–15.4)
EST. AVERAGE GLUCOSE BLD GHB EST-MCNC: 157 MG/DL
GLOBULIN SER CALC-MCNC: 2.7 G/DL (ref 1.5–4.5)
GLUCOSE SERPL-MCNC: 171 MG/DL (ref 65–99)
HBA1C MFR BLD: 7.1 % (ref 4.8–5.6)
HCT VFR BLD AUTO: 43 % (ref 34–46.6)
HGB BLD-MCNC: 14.6 G/DL (ref 11.1–15.9)
IMM GRANULOCYTES # BLD AUTO: 0 X10E3/UL (ref 0–0.1)
IMM GRANULOCYTES NFR BLD AUTO: 0 %
LYMPHOCYTES # BLD AUTO: 4.1 X10E3/UL (ref 0.7–3.1)
LYMPHOCYTES NFR BLD AUTO: 37 %
MCH RBC QN AUTO: 29.1 PG (ref 26.6–33)
MCHC RBC AUTO-ENTMCNC: 34 G/DL (ref 31.5–35.7)
MCV RBC AUTO: 86 FL (ref 79–97)
MONOCYTES # BLD AUTO: 0.5 X10E3/UL (ref 0.1–0.9)
MONOCYTES NFR BLD AUTO: 5 %
NEUTROPHILS # BLD AUTO: 5.9 X10E3/UL (ref 1.4–7)
NEUTROPHILS NFR BLD AUTO: 52 %
PLATELET # BLD AUTO: 291 X10E3/UL (ref 150–450)
POTASSIUM SERPL-SCNC: 4 MMOL/L (ref 3.5–5.2)
PROT SERPL-MCNC: 7.2 G/DL (ref 6–8.5)
RBC # BLD AUTO: 5.02 X10E6/UL (ref 3.77–5.28)
SODIUM SERPL-SCNC: 142 MMOL/L (ref 134–144)
WBC # BLD AUTO: 11.1 X10E3/UL (ref 3.4–10.8)

## 2021-06-13 NOTE — PROGRESS NOTES
Please notify pt the followin. Diabetes is well controlled. Continue current med regimen as prescribed. 2. Bilirubin is elevated.  I recommend we recheck at 4 week follow up and if still elevated will order an ultrasound of liver and gallbladder for further eval.

## 2021-06-14 NOTE — PROGRESS NOTES
Spoke with pt's  in regards to lab results; stated he understood and will notify pt; 4 wk follow up scheduled.

## 2021-07-02 RX ORDER — DULAGLUTIDE 0.75 MG/.5ML
0.75 INJECTION, SOLUTION SUBCUTANEOUS
Qty: 4 PEN | Refills: 2 | Status: SHIPPED | OUTPATIENT
Start: 2021-07-02 | End: 2021-08-23 | Stop reason: SDUPTHER

## 2021-07-16 ENCOUNTER — OFFICE VISIT (OUTPATIENT)
Dept: FAMILY MEDICINE CLINIC | Age: 56
End: 2021-07-16
Payer: COMMERCIAL

## 2021-07-16 VITALS
RESPIRATION RATE: 18 BRPM | WEIGHT: 149 LBS | BODY MASS INDEX: 28.13 KG/M2 | DIASTOLIC BLOOD PRESSURE: 71 MMHG | TEMPERATURE: 98.6 F | SYSTOLIC BLOOD PRESSURE: 106 MMHG | HEART RATE: 70 BPM | HEIGHT: 61 IN | OXYGEN SATURATION: 97 %

## 2021-07-16 DIAGNOSIS — G89.29 NECK PAIN, CHRONIC: ICD-10-CM

## 2021-07-16 DIAGNOSIS — M79.602 LEFT ARM PAIN: ICD-10-CM

## 2021-07-16 DIAGNOSIS — M54.2 NECK PAIN, CHRONIC: ICD-10-CM

## 2021-07-16 DIAGNOSIS — E80.6 HYPERBILIRUBINEMIA: Primary | ICD-10-CM

## 2021-07-16 PROCEDURE — 99213 OFFICE O/P EST LOW 20 MIN: CPT | Performed by: NURSE PRACTITIONER

## 2021-07-16 NOTE — PROGRESS NOTES
Chief Complaint   Patient presents with    Arm Pain    Labs     #:[id number]    Pt in office today for 4 wk f/u on left arm pain. Have completed prednisone therapy. Pt states pain is still present-pt noted no improvement in pain. Pt in office today for recheck on bilirubin levels. Chief Complaint   Patient presents with    Arm Pain    Labs     she is a 50y.o. year old female who presents for evalution. Reviewed PmHx, RxHx, FmHx, SocHx, AllgHx and updated and dated in the chart. Review of Systems - negative except as listed above in the HPI    Objective:     Vitals:    07/16/21 1556   BP: 106/71   Pulse: 70   Resp: 18   Temp: 98.6 °F (37 °C)   TempSrc: Oral   SpO2: 97%   Weight: 149 lb (67.6 kg)   Height: 5' 0.83\" (1.545 m)     Physical Examination: General appearance - alert, well appearing, and in no distress  Chest - clear to auscultation, no wheezes, rales or rhonchi, symmetric air entry  Heart - normal rate, regular rhythm, normal S1, S2, no murmurs  Abdomen - soft, nontender, nondistended, no masses or organomegaly  bowel sounds normal  Musculoskeletal - neck ROM intact, abnormal exam of left shoulder, reports pain with all ROM that radiates from shoulder down to arm to the elbow    Assessment/ Plan:   Diagnoses and all orders for this visit:    1. Hyperbilirubinemia  -     METABOLIC PANEL, COMPREHENSIVE; Future  If bili persistently high will order abd US to check liver and gallbladder. 2. Left arm pain  -     XR SHOULDER LT AP/LAT MIN 2 V; Future  Will notify results and deviate plan based on findings. 3. Neck pain, chronic  -     XR SPINE CERV 4 OR 5 V; Future  Will notify results and deviate plan based on findings. Follow-up and Dispositions    · Return if symptoms worsen or fail to improve. I have discussed the diagnosis with the patient and the intended plan as seen in the above orders.   The patient has received an after-visit summary and questions were answered concerning future plans. Medication Side Effects and Warnings were discussed with patient: yes  Patient Labs were reviewed and or requested: yes  Patient Past Records were reviewed and or requested  yes  Patient / Caregiver Understanding of treatment plan was verbalized during office visit YES    FREEMAN Davis    There are no Patient Instructions on file for this visit.

## 2021-07-16 NOTE — PROGRESS NOTES
Chief Complaint   Patient presents with    Arm Pain    Labs     #:[id number]    Pt in office today for 4 wk f/u on left arm pain. Have completed prednisone therapy. Pt states pain is still present-pt noted no improvement in pain. Pt in office today for recheck on bilirubin levels. 1. Have you been to the ER, urgent care clinic since your last visit? Hospitalized since your last visit? No    2. Have you seen or consulted any other health care providers outside of the [address] [place] [address] since your last visit? Include any pap smears or colon screening.  No

## 2021-07-17 LAB
ALBUMIN SERPL-MCNC: 4.6 G/DL (ref 3.8–4.9)
ALBUMIN/GLOB SERPL: 1.9 {RATIO} (ref 1.2–2.2)
ALP SERPL-CCNC: 137 IU/L (ref 48–121)
ALT SERPL-CCNC: 30 IU/L (ref 0–32)
AST SERPL-CCNC: 18 IU/L (ref 0–40)
BILIRUB SERPL-MCNC: 1.1 MG/DL (ref 0–1.2)
BUN SERPL-MCNC: 13 MG/DL (ref 6–24)
BUN/CREAT SERPL: 25 (ref 9–23)
CALCIUM SERPL-MCNC: 10 MG/DL (ref 8.7–10.2)
CHLORIDE SERPL-SCNC: 103 MMOL/L (ref 96–106)
CO2 SERPL-SCNC: 27 MMOL/L (ref 20–29)
CREAT SERPL-MCNC: 0.53 MG/DL (ref 0.57–1)
GLOBULIN SER CALC-MCNC: 2.4 G/DL (ref 1.5–4.5)
GLUCOSE SERPL-MCNC: 164 MG/DL (ref 65–99)
POTASSIUM SERPL-SCNC: 4 MMOL/L (ref 3.5–5.2)
PROT SERPL-MCNC: 7 G/DL (ref 6–8.5)
SODIUM SERPL-SCNC: 142 MMOL/L (ref 134–144)
SPECIMEN STATUS REPORT, ROLRST: NORMAL

## 2021-07-19 NOTE — PROGRESS NOTES
Please notify pt that her bilirubin has normalized and other liver tests have improved so no further work up indicated at this time. Will continue to monitor this closely with future routine labs. Follow up in 3 months to repeat labs. Okay to go ahead and complete xrays that were ordered during OV. I recommended she wait in case she would also need an abd US.

## 2021-07-23 ENCOUNTER — HOSPITAL ENCOUNTER (OUTPATIENT)
Dept: GENERAL RADIOLOGY | Age: 56
Discharge: HOME | End: 2021-07-23
Payer: COMMERCIAL

## 2021-07-23 DIAGNOSIS — M54.2 NECK PAIN, CHRONIC: ICD-10-CM

## 2021-07-23 DIAGNOSIS — G89.29 NECK PAIN, CHRONIC: ICD-10-CM

## 2021-07-23 DIAGNOSIS — M79.602 LEFT ARM PAIN: ICD-10-CM

## 2021-07-23 PROCEDURE — 72050 X-RAY EXAM NECK SPINE 4/5VWS: CPT

## 2021-07-23 PROCEDURE — 73030 X-RAY EXAM OF SHOULDER: CPT

## 2021-07-26 ENCOUNTER — TELEPHONE (OUTPATIENT)
Dept: FAMILY MEDICINE CLINIC | Age: 56
End: 2021-07-26

## 2021-07-26 NOTE — PROGRESS NOTES
Please notify pt that xray of shoulder shows some mild arthritic changes in the joint. No other acute findings.  I recommend she consider PT.

## 2021-07-26 NOTE — PROGRESS NOTES
Please notify pt that xray of neck shows some mild arthritic changes. No other acute findings.  I recommend she consider PT.

## 2021-07-26 NOTE — TELEPHONE ENCOUNTER
Spoke with pt,stated she would like imaging results to be given to spouse. Have left vm on spouse number to call office back.

## 2021-07-29 NOTE — PROGRESS NOTES
Gave pt's spouse imaging results via vm per request.  Will call nurse back to discuss which location for PT they would like;norm refer to Kishan Alves or [facility].

## 2021-07-29 NOTE — PROGRESS NOTES
Gave pt's spouse imaging results via vm per request.  Will call nurse back to discuss which location for PT they would like;norm refer to Margaux Babin or [facility].

## 2021-07-29 NOTE — TELEPHONE ENCOUNTER
is returning your call he said you could leave message [phone] he is at work and can't get the phone

## 2021-07-29 NOTE — TELEPHONE ENCOUNTER
Reviewed refer to result note. Awaiting return call for pt's choice of PT location-norm refer to Bianka Wallis or [facility].

## 2021-08-23 RX ORDER — DULAGLUTIDE 0.75 MG/.5ML
0.75 INJECTION, SOLUTION SUBCUTANEOUS
Qty: 4 PEN | Refills: 2 | Status: SHIPPED | OUTPATIENT
Start: 2021-08-23 | End: 2021-10-18 | Stop reason: SDUPTHER

## 2021-09-15 ENCOUNTER — TELEPHONE (OUTPATIENT)
Dept: FAMILY MEDICINE CLINIC | Age: 56
End: 2021-09-15

## 2021-09-15 NOTE — TELEPHONE ENCOUNTER
[facility] [address] (not on [address]) calling for patient because does not speak good [place]. Patient states that Flonase spray & zyrtec is not helping with her allergies. She is having nasal congestion & sneezing a lot, no fever. What do you recommend she do?  [facility] [address] can be reached @[phone]

## 2021-09-16 RX ORDER — CARBINOXAMINE MALEATE 4 MG/1
1 TABLET ORAL 2 TIMES DAILY
Qty: 60 TABLET | Refills: 2 | Status: SHIPPED | OUTPATIENT
Start: 2021-09-16 | End: 2022-02-03

## 2021-09-16 NOTE — TELEPHONE ENCOUNTER
Advised to continue Flonase,d/c zyrtec,try otc Singulair or Claritin-stated she understood will notify pt.

## 2021-09-16 NOTE — TELEPHONE ENCOUNTER
Singulair isn't otc, it was listed as a historical med. I can refill if she has not been taking and needs more.

## 2021-09-16 NOTE — TELEPHONE ENCOUNTER
Have spoken with pt's daughter. States pt is taking zyrtec,singulair,and flonase with no improvement.

## 2021-10-18 RX ORDER — DULAGLUTIDE 0.75 MG/.5ML
0.75 INJECTION, SOLUTION SUBCUTANEOUS
Qty: 4 PEN | Refills: 2 | Status: SHIPPED | OUTPATIENT
Start: 2021-10-18 | End: 2022-01-10 | Stop reason: SDUPTHER

## 2021-10-21 ENCOUNTER — TELEPHONE (OUTPATIENT)
Dept: FAMILY MEDICINE CLINIC | Age: 56
End: 2021-10-21

## 2021-10-21 RX ORDER — PREDNISONE 10 MG/1
TABLET ORAL
Qty: 21 TABLET | Refills: 0 | Status: SHIPPED | OUTPATIENT
Start: 2021-10-21 | End: 2021-11-19

## 2021-10-21 NOTE — TELEPHONE ENCOUNTER
Pt has started palgic-bid, with flonase,with no relief noted. Have been treating with albuterol treatment-last dose this am.  Have noted cough and son that has worsened this wk. States only thing that helps is prednisone-advised can send in a dose pack but this is not to be used long term. Pt had appt on 10/15 but appt was cancelled.

## 2021-10-21 NOTE — TELEPHONE ENCOUNTER
Pt is wondering if there is anything else she can take for her allergies. States that the zyrtec isn't working. Call back number for her  Manav Gannonw (on \[facility]\"") is [phone]. Thanks.

## 2021-11-01 RX ORDER — LISINOPRIL 20 MG/1
20 TABLET ORAL DAILY
Qty: 30 TABLET | Refills: 5 | Status: SHIPPED | OUTPATIENT
Start: 2021-11-01 | End: 2022-02-04 | Stop reason: SDUPTHER

## 2021-11-08 PROBLEM — E11.3293 TYPE 2 DIABETES MELLITUS WITH BOTH EYES AFFECTED BY MILD NONPROLIFERATIVE RETINOPATHY WITHOUT MACULAR EDEMA, WITHOUT LONG-TERM CURRENT USE OF INSULIN (HCC): Status: ACTIVE | Noted: 2021-11-08

## 2021-11-19 ENCOUNTER — OFFICE VISIT (OUTPATIENT)
Dept: FAMILY MEDICINE CLINIC | Age: 56
End: 2021-11-19
Payer: COMMERCIAL

## 2021-11-19 VITALS
TEMPERATURE: 98.6 F | BODY MASS INDEX: 28.51 KG/M2 | OXYGEN SATURATION: 98 % | HEIGHT: 61 IN | RESPIRATION RATE: 18 BRPM | WEIGHT: 151 LBS | DIASTOLIC BLOOD PRESSURE: 76 MMHG | SYSTOLIC BLOOD PRESSURE: 117 MMHG | HEART RATE: 64 BPM

## 2021-11-19 DIAGNOSIS — M13.0 POLYARTHRITIS: ICD-10-CM

## 2021-11-19 DIAGNOSIS — Z79.4 TYPE 2 DIABETES MELLITUS WITHOUT COMPLICATION, WITH LONG-TERM CURRENT USE OF INSULIN (HCC): Primary | ICD-10-CM

## 2021-11-19 DIAGNOSIS — M25.531 ACUTE PAIN OF RIGHT WRIST: ICD-10-CM

## 2021-11-19 DIAGNOSIS — I10 ESSENTIAL HYPERTENSION: ICD-10-CM

## 2021-11-19 DIAGNOSIS — Z23 NEEDS FLU SHOT: ICD-10-CM

## 2021-11-19 DIAGNOSIS — E11.9 TYPE 2 DIABETES MELLITUS WITHOUT COMPLICATION, WITH LONG-TERM CURRENT USE OF INSULIN (HCC): Primary | ICD-10-CM

## 2021-11-19 DIAGNOSIS — M54.42 ACUTE LEFT-SIDED LOW BACK PAIN WITH LEFT-SIDED SCIATICA: ICD-10-CM

## 2021-11-19 DIAGNOSIS — Z11.59 ENCOUNTER FOR HEPATITIS C SCREENING TEST FOR LOW RISK PATIENT: ICD-10-CM

## 2021-11-19 PROCEDURE — 99215 OFFICE O/P EST HI 40 MIN: CPT | Performed by: NURSE PRACTITIONER

## 2021-11-19 PROCEDURE — 90686 IIV4 VACC NO PRSV 0.5 ML IM: CPT | Performed by: NURSE PRACTITIONER

## 2021-11-19 PROCEDURE — 90471 IMMUNIZATION ADMIN: CPT | Performed by: NURSE PRACTITIONER

## 2021-11-19 PROCEDURE — 3051F HG A1C>EQUAL 7.0%<8.0%: CPT | Performed by: NURSE PRACTITIONER

## 2021-11-19 RX ORDER — PREDNISONE 10 MG/1
TABLET ORAL
Qty: 21 TABLET | Refills: 0 | Status: SHIPPED | OUTPATIENT
Start: 2021-11-19 | End: 2022-01-03 | Stop reason: SDUPTHER

## 2021-11-19 RX ORDER — MELOXICAM 15 MG/1
15 TABLET ORAL DAILY
Qty: 30 TABLET | Refills: 2 | Status: SHIPPED | OUTPATIENT
Start: 2021-11-19 | End: 2022-02-03

## 2021-11-19 RX ORDER — BACLOFEN 20 MG/1
20 TABLET ORAL
Qty: 45 TABLET | Refills: 1 | Status: SHIPPED | OUTPATIENT
Start: 2021-11-19 | End: 2021-12-13 | Stop reason: SDUPTHER

## 2021-11-19 NOTE — PROGRESS NOTES
Chief Complaint   Patient presents with    LOW BACK PAIN    Wrist Pain     Intepreter #:[id number]      Have c/o of low back pain that began Tuesday. Describes pain as a intermittent sharp pain. Pain is worse at night. Pt notes tingling in lower extremities. Pt denies injury  Pt does have a hx of sciatica. Pt denies urinary sx. Have been treating with bengay with no relief noted. Have c/o of right wrist that began 3 months ago. Pt denies injury to wrist.  Pt describes pain as a sharp constant pain. Have been treating with Advil with no relief noted. Pt / caregiver given opportunity to review vaccine information sheet prior to vaccine administration. Opportunity given for questions and concerns. No questions or concerns at this time. 1. Have you been to the ER, urgent care clinic since your last visit? Hospitalized since your last visit? No    2. Have you seen or consulted any other health care providers outside of the [address] [place] [address] since your last visit? Include any pap smears or colon screening.  No

## 2021-11-19 NOTE — PROGRESS NOTES
Chief Complaint   Patient presents with    LOW BACK PAIN    Wrist Pain     Intepreter #:[id number]    Have c/o of low back pain that began Tuesday after falling. Pt went to the bathroom, floor was wet, slipped and fell. Hit the floor. Describes pain as a intermittent sharp pain. Pain is worse at night. Has previously had back problems, sciatica. Pointing to the mid low back and worse on the left side. Pt notes tingling in lower extremities. Only noticing in the left. Pt denies urinary sx. Have been treating with bengay with no relief noted. Also Advil PRN. Rates pain as a 9/10. Pain radiates down the left leg. Have c/o of right wrist that began 3 months ago. Feels a shooting pain. Pt denies injury to wrist. Denies any tingling or numbness in the fingers. Denies any swelling redness or warmth. Pt describes pain as a sharp constant pain. Have been treating with Advil with no relief noted. Pt would like to discuss flu shot today. Questioning need for pneumonia vaccine but has already received. Requesting to have urine checked to check kidney function. Chief Complaint   Patient presents with    LOW BACK PAIN    Wrist Pain     she is a 50y.o. year old female who presents for evalution. Reviewed PmHx, RxHx, FmHx, SocHx, AllgHx and updated and dated in the chart.     Review of Systems - negative except as listed above in the HPI    Objective:     Vitals:    11/19/21 1509   BP: 117/76   Pulse: 64   Resp: 18   Temp: 98.6 °F (37 °C)   TempSrc: Oral   SpO2: 98%   Weight: 151 lb (68.5 kg)   Height: 5' 0.83\" (1.545 m)     Physical Examination: General appearance - alert, well appearing, and in no distress  Mental status - normal mood, behavior, speech, dress, motor activity, and thought processes  Eyes - pupils equal and reactive, extraocular eye movements intact  Ears - bilateral TM's and external ear canals normal  Nose - normal and patent, no erythema, discharge or polyps and normal nontender sinuses  Mouth - mucous membranes moist, pharynx normal without lesions  Neck - supple, no significant adenopathy, carotids upstroke normal bilaterally, no bruits, thyroid exam: thyroid is normal in size without nodules or tenderness  Chest - clear to auscultation, no wheezes, rales or rhonchi, symmetric air entry  Heart - normal rate, regular rhythm, normal S1, S2, no murmurs  Back exam - antalgic gait, pain with motion noted during exam, tenderness noted along lumbar spine dex on the left side and left gluteal region, positive straight-leg raise left ipsilateral leg, normal reflexes and strength bilateral lower extremities  Musculoskeletal - abnormal exam of right wrist, reports pain with manipulation of the wrist and with all ROM, no visible swelling or redness, neg tinnels and phalens  Extremities - peripheral pulses normal    Assessment/ Plan:   Diagnoses and all orders for this visit:    1. Type 2 diabetes mellitus without complication, with long-term current use of insulin (HCC)  -     METABOLIC PANEL, COMPREHENSIVE; Future  -     CBC WITH AUTOMATED DIFF; Future  -     HEMOGLOBIN A1C WITH EAG; Future  -     MICROALBUMIN, UR, RAND W/ MICROALB/CREAT RATIO; Future  Will notify results and deviate plan based on findings. 2. Essential hypertension  -     TSH 3RD GENERATION; Future  -     LIPID PANEL; Future  BP at goal on med regimen. 3. Acute pain of right wrist  -     URIC ACID; Future  -     predniSONE (STERAPRED DS) 10 mg dose pack; See administration instruction per 10mg dose pack  -     XR WRIST LT AP/LAT/OBL MIN 3V; Future  Xray for further evaluation. Will notify results and deviate plan based on findings. Reviewed supportive measures. 4. Polyarthritis  -     meloxicam (MOBIC) 15 mg tablet; Take 1 Tablet by mouth daily. Recommended pt start mobic daily for generalized aches and pains likely due to OA.    5. Acute left-sided low back pain with left-sided sciatica  -     baclofen (LIORESAL) 20 mg tablet; Take 1 Tablet by mouth three (3) times daily as needed for Muscle Spasm(s). -     predniSONE (STERAPRED DS) 10 mg dose pack; See administration instruction per 10mg dose pack  Start med regimen to calm down inflammation. Reinforced SEs/ADRs. Enc to alternate heat and ice. Rest for 24 hours then start stretching and exercising to strengthen the low back muscles and prevent further injury. Massage painful area to relieve muscle tension. OTC NSAID for pain/inflammation. Work on good body mechanics, avoid heavy lifting. RTC if sx persist or worsen. 6. Encounter for hepatitis C screening test for low risk patient  -     HEPATITIS C AB; Future  Screening, asx.   7. Needs flu shot  -     INFLUENZA VIRUS VAC QUAD,SPLIT,PRESV FREE SYRINGE IM  Given. Follow-up and Dispositions    · Return in about 3 months (around 2/19/2022) for complete physical.         I have discussed the diagnosis with the patient and the intended plan as seen in the above orders. The patient has received an after-visit summary and questions were answered concerning future plans. Medication Side Effects and Warnings were discussed with patient: yes  Patient Labs were reviewed and or requested: yes  Patient Past Records were reviewed and or requested  yes  Patient / Caregiver Understanding of treatment plan was verbalized during office visit YES    FREEMAN Mallory    There are no Patient Instructions on file for this visit.

## 2021-11-22 LAB
ALBUMIN SERPL-MCNC: 4.6 G/DL (ref 3.8–4.9)
ALBUMIN/CREAT UR: 9 MG/G CREAT (ref 0–29)
ALBUMIN/GLOB SERPL: 1.7 {RATIO} (ref 1.2–2.2)
ALP SERPL-CCNC: 125 IU/L (ref 44–121)
ALT SERPL-CCNC: 38 IU/L (ref 0–32)
AST SERPL-CCNC: 27 IU/L (ref 0–40)
BASOPHILS # BLD AUTO: 0.1 X10E3/UL (ref 0–0.2)
BASOPHILS NFR BLD AUTO: 1 %
BILIRUB SERPL-MCNC: 1.4 MG/DL (ref 0–1.2)
BUN SERPL-MCNC: 12 MG/DL (ref 6–24)
BUN/CREAT SERPL: 18 (ref 9–23)
CALCIUM SERPL-MCNC: 9.8 MG/DL (ref 8.7–10.2)
CHLORIDE SERPL-SCNC: 102 MMOL/L (ref 96–106)
CHOLEST SERPL-MCNC: 135 MG/DL (ref 100–199)
CO2 SERPL-SCNC: 25 MMOL/L (ref 20–29)
CREAT SERPL-MCNC: 0.66 MG/DL (ref 0.57–1)
CREAT UR-MCNC: 96.5 MG/DL
EOSINOPHIL # BLD AUTO: 0.4 X10E3/UL (ref 0–0.4)
EOSINOPHIL NFR BLD AUTO: 4 %
ERYTHROCYTE [DISTWIDTH] IN BLOOD BY AUTOMATED COUNT: 13 % (ref 11.7–15.4)
EST. AVERAGE GLUCOSE BLD GHB EST-MCNC: 148 MG/DL
GLOBULIN SER CALC-MCNC: 2.7 G/DL (ref 1.5–4.5)
GLUCOSE SERPL-MCNC: 90 MG/DL (ref 65–99)
HBA1C MFR BLD: 6.8 % (ref 4.8–5.6)
HCT VFR BLD AUTO: 44 % (ref 34–46.6)
HCV AB S/CO SERPL IA: 0.4 S/CO RATIO (ref 0–0.9)
HDLC SERPL-MCNC: 40 MG/DL
HGB BLD-MCNC: 15 G/DL (ref 11.1–15.9)
IMM GRANULOCYTES # BLD AUTO: 0 X10E3/UL (ref 0–0.1)
IMM GRANULOCYTES NFR BLD AUTO: 0 %
IMP & REVIEW OF LAB RESULTS: NORMAL
LDLC SERPL CALC-MCNC: 62 MG/DL (ref 0–99)
LYMPHOCYTES # BLD AUTO: 4.9 X10E3/UL (ref 0.7–3.1)
LYMPHOCYTES NFR BLD AUTO: 48 %
MCH RBC QN AUTO: 28.9 PG (ref 26.6–33)
MCHC RBC AUTO-ENTMCNC: 34.1 G/DL (ref 31.5–35.7)
MCV RBC AUTO: 85 FL (ref 79–97)
MICROALBUMIN UR-MCNC: 8.7 UG/ML
MONOCYTES # BLD AUTO: 0.7 X10E3/UL (ref 0.1–0.9)
MONOCYTES NFR BLD AUTO: 7 %
NEUTROPHILS # BLD AUTO: 4 X10E3/UL (ref 1.4–7)
NEUTROPHILS NFR BLD AUTO: 40 %
PLATELET # BLD AUTO: 313 X10E3/UL (ref 150–450)
POTASSIUM SERPL-SCNC: 4 MMOL/L (ref 3.5–5.2)
PROT SERPL-MCNC: 7.3 G/DL (ref 6–8.5)
RBC # BLD AUTO: 5.19 X10E6/UL (ref 3.77–5.28)
SODIUM SERPL-SCNC: 143 MMOL/L (ref 134–144)
TRIGL SERPL-MCNC: 203 MG/DL (ref 0–149)
TSH SERPL DL<=0.005 MIU/L-ACNC: 2.39 UIU/ML (ref 0.45–4.5)
URATE SERPL-MCNC: 2.5 MG/DL (ref 3–7.2)
VLDLC SERPL CALC-MCNC: 33 MG/DL (ref 5–40)
WBC # BLD AUTO: 10.1 X10E3/UL (ref 3.4–10.8)

## 2021-11-23 ENCOUNTER — TELEPHONE (OUTPATIENT)
Dept: FAMILY MEDICINE CLINIC | Age: 56
End: 2021-11-23

## 2021-11-23 DIAGNOSIS — E80.6 HYPERBILIRUBINEMIA: Primary | ICD-10-CM

## 2021-11-23 DIAGNOSIS — R74.01 TRANSAMINITIS: ICD-10-CM

## 2021-11-23 NOTE — PROGRESS NOTES
Please notify pt the followin. Diabetes and cholesterol are very well controlled. Enc to continue med regimen as prescribed. 2. Her bilirubin is up again so I am going to order an US of the liver and gallbladder for further evaluation. Should receive a call from central scheduling and will deviate plan based on findings. All other labs are normal. Repeat in 6 months, fasting.

## 2021-11-23 NOTE — TELEPHONE ENCOUNTER
Patient's daughter/Enid is returning nurses call for lab results. She is  Not on PHI.  Enid's phone: [phone]

## 2021-11-24 ENCOUNTER — HOSPITAL ENCOUNTER (OUTPATIENT)
Dept: GENERAL RADIOLOGY | Age: 56
Discharge: HOME | End: 2021-11-24
Payer: COMMERCIAL

## 2021-11-24 ENCOUNTER — TELEPHONE (OUTPATIENT)
Dept: FAMILY MEDICINE CLINIC | Age: 56
End: 2021-11-24

## 2021-11-24 DIAGNOSIS — M25.531 ACUTE PAIN OF RIGHT WRIST: ICD-10-CM

## 2021-11-24 PROCEDURE — 73110 X-RAY EXAM OF WRIST: CPT

## 2021-11-24 NOTE — PROGRESS NOTES
Please notify pt that xray of wrist is normal showing no fracture dislocation or other bony abnormality.

## 2021-12-13 DIAGNOSIS — M54.42 ACUTE LEFT-SIDED LOW BACK PAIN WITH LEFT-SIDED SCIATICA: ICD-10-CM

## 2021-12-13 RX ORDER — BACLOFEN 20 MG/1
20 TABLET ORAL
Qty: 45 TABLET | Refills: 1 | Status: SHIPPED | OUTPATIENT
Start: 2021-12-13 | End: 2022-01-16

## 2021-12-27 RX ORDER — ATORVASTATIN CALCIUM 10 MG/1
10 TABLET, FILM COATED ORAL DAILY
Qty: 30 TABLET | Refills: 5 | Status: SHIPPED | OUTPATIENT
Start: 2021-12-27 | End: 2022-02-04 | Stop reason: SDUPTHER

## 2022-01-03 DIAGNOSIS — M25.531 ACUTE PAIN OF RIGHT WRIST: ICD-10-CM

## 2022-01-03 DIAGNOSIS — M54.42 ACUTE LEFT-SIDED LOW BACK PAIN WITH LEFT-SIDED SCIATICA: ICD-10-CM

## 2022-01-04 RX ORDER — PREDNISONE 10 MG/1
TABLET ORAL
Qty: 21 TABLET | Refills: 0 | Status: SHIPPED | OUTPATIENT
Start: 2022-01-04 | End: 2022-02-03 | Stop reason: ALTCHOICE

## 2022-01-11 RX ORDER — DULAGLUTIDE 0.75 MG/.5ML
0.75 INJECTION, SOLUTION SUBCUTANEOUS
Qty: 4 PEN | Refills: 2 | Status: SHIPPED | OUTPATIENT
Start: 2022-01-11 | End: 2022-01-11

## 2022-01-11 RX ORDER — DULAGLUTIDE 0.75 MG/.5ML
0.75 INJECTION, SOLUTION SUBCUTANEOUS
Qty: 4 EACH | Refills: 2 | Status: SHIPPED | OUTPATIENT
Start: 2022-01-11 | End: 2022-02-04 | Stop reason: SDUPTHER

## 2022-01-16 DIAGNOSIS — M54.42 ACUTE LEFT-SIDED LOW BACK PAIN WITH LEFT-SIDED SCIATICA: ICD-10-CM

## 2022-01-16 RX ORDER — BACLOFEN 20 MG/1
TABLET ORAL
Qty: 45 TABLET | Refills: 1 | Status: SHIPPED | OUTPATIENT
Start: 2022-01-16 | End: 2022-02-03

## 2022-01-31 ENCOUNTER — DOCUMENTATION ONLY (OUTPATIENT)
Dept: FAMILY MEDICINE CLINIC | Age: 57
End: 2022-01-31

## 2022-01-31 DIAGNOSIS — M54.42 ACUTE LEFT-SIDED LOW BACK PAIN WITH LEFT-SIDED SCIATICA: ICD-10-CM

## 2022-01-31 RX ORDER — BACLOFEN 20 MG/1
TABLET ORAL
Qty: 45 TABLET | Refills: 1 | OUTPATIENT
Start: 2022-01-31

## 2022-02-03 ENCOUNTER — OFFICE VISIT (OUTPATIENT)
Dept: FAMILY MEDICINE CLINIC | Age: 57
End: 2022-02-03
Payer: COMMERCIAL

## 2022-02-03 VITALS
DIASTOLIC BLOOD PRESSURE: 85 MMHG | SYSTOLIC BLOOD PRESSURE: 124 MMHG | HEIGHT: 60 IN | BODY MASS INDEX: 29.68 KG/M2 | WEIGHT: 151.2 LBS | OXYGEN SATURATION: 96 % | HEART RATE: 75 BPM

## 2022-02-03 DIAGNOSIS — I10 ESSENTIAL HYPERTENSION: ICD-10-CM

## 2022-02-03 DIAGNOSIS — J45.40 MODERATE PERSISTENT ASTHMA WITHOUT COMPLICATION: ICD-10-CM

## 2022-02-03 DIAGNOSIS — Z79.4 TYPE 2 DIABETES MELLITUS WITHOUT COMPLICATION, WITH LONG-TERM CURRENT USE OF INSULIN (HCC): Primary | ICD-10-CM

## 2022-02-03 DIAGNOSIS — M54.42 ACUTE LEFT-SIDED LOW BACK PAIN WITH LEFT-SIDED SCIATICA: ICD-10-CM

## 2022-02-03 DIAGNOSIS — E80.6 HYPERBILIRUBINEMIA: ICD-10-CM

## 2022-02-03 DIAGNOSIS — E11.9 TYPE 2 DIABETES MELLITUS WITHOUT COMPLICATION, WITH LONG-TERM CURRENT USE OF INSULIN (HCC): Primary | ICD-10-CM

## 2022-02-03 DIAGNOSIS — E78.00 HIGH CHOLESTEROL: ICD-10-CM

## 2022-02-03 PROCEDURE — 3051F HG A1C>EQUAL 7.0%<8.0%: CPT | Performed by: NURSE PRACTITIONER

## 2022-02-03 PROCEDURE — 99214 OFFICE O/P EST MOD 30 MIN: CPT | Performed by: NURSE PRACTITIONER

## 2022-02-03 RX ORDER — FLUTICASONE FUROATE AND VILANTEROL TRIFENATATE 100; 25 UG/1; UG/1
1 POWDER RESPIRATORY (INHALATION) DAILY
Qty: 60 EACH | Refills: 2 | Status: SHIPPED | OUTPATIENT
Start: 2022-02-03 | End: 2022-02-09 | Stop reason: SDUPTHER

## 2022-02-03 NOTE — PROGRESS NOTES
Chief Complaint   Patient presents with    Back Pain     low back pain x1 month. Tried meloxican, but stopped. States the Rx does not work, just makes me droswy. Tried OCT advil without improvement.  Diabetes     Requesting A1C to be checked.  Request Records     Pt states she is going to [facility] and is requesting a copy of her medical record,      number [id number]. Pt it moving to [address] [place] [address] February 28th. Pt is very happy about this. Prescribed baclofen and mobic for her back pain. States that the mobic made her drowsy and the baclofen didn't seem to help. States that no medications have helped her back pain. I saw her for this in November, it started after a fall. Has previously seen spine specialist who did injections which helped her for about 6 months. Also losing her insurance this month. Has previously tried lidocaine patches which didn't seem to help. Has not had any recent imaging of the low back. Pt is interested in having an xray done for further evaluation. Getting her mammogram tomorrow and pap smear later this month. Pt also requesting something for her asthma. Wheezing and SOB at times. Denies any other concerns at this time. Chief Complaint   Patient presents with    Back Pain     low back pain x1 month. Tried meloxican, but stopped. States the Rx does not work, just makes me droswy. Tried OCT advil without improvement.  Diabetes     Requesting A1C to be checked.  Request Records     Pt states she is going to [facility] and is requesting a copy of her medical record,     she is a 49y.o. year old female who presents for evalution. Reviewed PmHx, RxHx, FmHx, SocHx, AllgHx and updated and dated in the chart.     Review of Systems - negative except as listed above in the HPI    Objective:     Vitals:    02/03/22 1541   BP: 124/85   Pulse: 75   SpO2: 96%   Weight: 151 lb 3.2 oz (68.6 kg)   Height: 5' (1.524 m)     Physical Examination: General appearance - alert, well appearing, and in no distress  Mental status - normal mood, behavior  Eyes - pupils equal and reactive, extraocular eye movements intact  Ears - bilateral TM's and external ear canals normal  Nose - normal and patent, no erythema, discharge or polyps and normal nontender sinuses  Mouth - mucous membranes moist, pharynx normal without lesions  Neck - supple, no significant adenopathy  Chest - clear to auscultation, no wheezes, rales or rhonchi, symmetric air entry  Heart - normal rate, regular rhythm, normal S1, S2, no murmurs  Back exam - limited range of motion, pain with motion noted during exam  Extremities - peripheral pulses normal, no edema, no clubbing or cyanosis  Skin - normal coloration and turgor    Assessment/ Plan:   Diagnoses and all orders for this visit:    1. Type 2 diabetes mellitus without complication, with long-term current use of insulin (HCC)  -     AMB POC HEMOGLOBIN A1C  -     CBC WITH AUTOMATED DIFF; Future  -     HEMOGLOBIN A1C WITH EAG; Future  -     dulaglutide (Trulicity) 5.56 FB/0.7 mL sub-q pen; 0.5 mL by SubCUTAneous route every seven (7) days. -     insulin glargine (LANTUS,BASAGLAR) 100 unit/mL (3 mL) inpn; Inject 64 units SQ once daily. Will notify results and deviate plan based on findings. Continue with efforts to follow a low carb diet and continue compliance with med regimen. 2. Essential hypertension  -     lisinopriL (PRINIVIL, ZESTRIL) 20 mg tablet; Take 1 Tablet by mouth daily. BP at goal on med regimen, continue taking daily as prescribed. 3. High cholesterol  -     atorvastatin (LIPITOR) 10 mg tablet; Take 1 Tablet by mouth daily. Refilled rx. Continue taking daily. 4. Hyperbilirubinemia  -     METABOLIC PANEL, COMPREHENSIVE; Future  Rechecking today. Enc pt to schedule the previously ordered US for further eval of liver and gallbladder.    5. Acute left-sided low back pain with left-sided sciatica  -     XR SPINE LUMB 2 OR 3 V; Future  Recommended xray for further evaluation. Will notify results and deviate plan based on findings. 6. Moderate persistent asthma without complication  -     fluticasone furoate-vilanteroL (Breo Ellipta) 100-25 mcg/dose inhaler; Take 1 Puff by inhalation daily. Recommended pt try breo once daily for management of her asthma sx. Reviewed SEs/ADRs of medication. Follow up if sx persist or worsen. Continue daily allergy medication. Other orders  -     cetirizine (ZYRTEC) 10 mg tablet; Take 1 Tablet by mouth daily. Follow-up and Dispositions    · Return if symptoms worsen or fail to improve. I have discussed the diagnosis with the patient and the intended plan as seen in the above orders. The patient has received an after-visit summary and questions were answered concerning future plans. Medication Side Effects and Warnings were discussed with patient: yes  Patient Labs were reviewed and or requested: yes  Patient Past Records were reviewed and or requested  yes  Patient / Caregiver Understanding of treatment plan was verbalized during office visit YES    TRAY Hollis-C    There are no Patient Instructions on file for this visit.

## 2022-02-03 NOTE — PROGRESS NOTES
Chief Complaint   Patient presents with    Back Pain     low back pain x1 month. Tried meloxican, but stopped. States the Rx does not work, just makes me droswy. Tried OCT advil without improvement.  Diabetes     Requesting A1C to be checked.  Request Records     Pt states she is going to [place] and is requesting a copy of her medical record,     Vitals:    02/03/22 1541   BP: 124/85   BP 1 Location: Left upper arm   BP Patient Position: Sitting   BP Cuff Size: Adult   Pulse: 75   Height: 5' (1.524 m)   Weight: 151 lb 3.2 oz (68.6 kg)   SpO2: 96%     1. Have you been to the ER, urgent care clinic since your last visit? Hospitalized since your last visit? No    2. Have you seen or consulted any other health care providers outside of the [address] [place] [address] since your last visit? Include any pap smears or colon screening.  No

## 2022-02-04 DIAGNOSIS — M54.42 ACUTE LEFT-SIDED LOW BACK PAIN WITH LEFT-SIDED SCIATICA: ICD-10-CM

## 2022-02-04 DIAGNOSIS — M25.531 ACUTE PAIN OF RIGHT WRIST: ICD-10-CM

## 2022-02-04 LAB
ALBUMIN SERPL-MCNC: 4.3 G/DL (ref 3.8–4.9)
ALBUMIN/GLOB SERPL: 1.5 {RATIO} (ref 1.2–2.2)
ALP SERPL-CCNC: 115 IU/L (ref 44–121)
ALT SERPL-CCNC: 30 IU/L (ref 0–32)
AST SERPL-CCNC: 21 IU/L (ref 0–40)
BASOPHILS # BLD AUTO: 0.1 X10E3/UL (ref 0–0.2)
BASOPHILS NFR BLD AUTO: 1 %
BILIRUB SERPL-MCNC: 1.2 MG/DL (ref 0–1.2)
BUN SERPL-MCNC: 15 MG/DL (ref 6–24)
BUN/CREAT SERPL: 26 (ref 9–23)
CALCIUM SERPL-MCNC: 9.9 MG/DL (ref 8.7–10.2)
CHLORIDE SERPL-SCNC: 109 MMOL/L (ref 96–106)
CO2 SERPL-SCNC: 25 MMOL/L (ref 20–29)
CREAT SERPL-MCNC: 0.57 MG/DL (ref 0.57–1)
EOSINOPHIL # BLD AUTO: 0.6 X10E3/UL (ref 0–0.4)
EOSINOPHIL NFR BLD AUTO: 6 %
ERYTHROCYTE [DISTWIDTH] IN BLOOD BY AUTOMATED COUNT: 13.3 % (ref 11.7–15.4)
EST. AVERAGE GLUCOSE BLD GHB EST-MCNC: 169 MG/DL
GLOBULIN SER CALC-MCNC: 2.8 G/DL (ref 1.5–4.5)
GLUCOSE SERPL-MCNC: 123 MG/DL (ref 65–99)
HBA1C MFR BLD: 7.5 % (ref 4.8–5.6)
HCT VFR BLD AUTO: 41.4 % (ref 34–46.6)
HGB BLD-MCNC: 14.3 G/DL (ref 11.1–15.9)
IMM GRANULOCYTES # BLD AUTO: 0 X10E3/UL (ref 0–0.1)
IMM GRANULOCYTES NFR BLD AUTO: 0 %
LYMPHOCYTES # BLD AUTO: 4.1 X10E3/UL (ref 0.7–3.1)
LYMPHOCYTES NFR BLD AUTO: 43 %
MCH RBC QN AUTO: 29.1 PG (ref 26.6–33)
MCHC RBC AUTO-ENTMCNC: 34.5 G/DL (ref 31.5–35.7)
MCV RBC AUTO: 84 FL (ref 79–97)
MONOCYTES # BLD AUTO: 0.6 X10E3/UL (ref 0.1–0.9)
MONOCYTES NFR BLD AUTO: 7 %
NEUTROPHILS # BLD AUTO: 4.1 X10E3/UL (ref 1.4–7)
NEUTROPHILS NFR BLD AUTO: 43 %
PLATELET # BLD AUTO: 296 X10E3/UL (ref 150–450)
POTASSIUM SERPL-SCNC: 4.2 MMOL/L (ref 3.5–5.2)
PROT SERPL-MCNC: 7.1 G/DL (ref 6–8.5)
RBC # BLD AUTO: 4.91 X10E6/UL (ref 3.77–5.28)
SODIUM SERPL-SCNC: 147 MMOL/L (ref 134–144)
WBC # BLD AUTO: 9.5 X10E3/UL (ref 3.4–10.8)

## 2022-02-04 RX ORDER — INSULIN GLARGINE 100 [IU]/ML
INJECTION, SOLUTION SUBCUTANEOUS
Qty: 20 PEN | Refills: 1 | Status: SHIPPED | OUTPATIENT
Start: 2022-02-04 | End: 2022-02-09 | Stop reason: SDUPTHER

## 2022-02-04 RX ORDER — PREDNISONE 10 MG/1
TABLET ORAL
Qty: 1 DOSE PACK | Refills: 0 | Status: SHIPPED | OUTPATIENT
Start: 2022-02-04 | End: 2022-02-15

## 2022-02-04 RX ORDER — CETIRIZINE HCL 10 MG
10 TABLET ORAL DAILY
Qty: 90 TABLET | Refills: 1 | Status: SHIPPED | OUTPATIENT
Start: 2022-02-04 | End: 2022-02-09 | Stop reason: SDUPTHER

## 2022-02-04 RX ORDER — DULAGLUTIDE 0.75 MG/.5ML
0.75 INJECTION, SOLUTION SUBCUTANEOUS
Qty: 12 EACH | Refills: 1 | Status: SHIPPED | OUTPATIENT
Start: 2022-02-04

## 2022-02-04 RX ORDER — ATORVASTATIN CALCIUM 10 MG/1
10 TABLET, FILM COATED ORAL DAILY
Qty: 90 TABLET | Refills: 1 | Status: SHIPPED | OUTPATIENT
Start: 2022-02-04

## 2022-02-04 RX ORDER — LISINOPRIL 20 MG/1
20 TABLET ORAL DAILY
Qty: 90 TABLET | Refills: 1 | Status: SHIPPED | OUTPATIENT
Start: 2022-02-04 | End: 2022-02-09 | Stop reason: SDUPTHER

## 2022-02-04 NOTE — PROGRESS NOTES
Please notify pt the following:  Her labs all look good. Diabetes slightly worse so work a little harder on diet. Bilirubin level has normalized but I still recommend she try to get the US done that was ordered before she goes back to [facility]. Once I get the results of the xray and US will leave a copy of her medical records at  for . Continue current med regimen as prescribed.

## 2022-02-09 DIAGNOSIS — I10 ESSENTIAL HYPERTENSION: ICD-10-CM

## 2022-02-09 DIAGNOSIS — E11.9 TYPE 2 DIABETES MELLITUS WITHOUT COMPLICATION, WITH LONG-TERM CURRENT USE OF INSULIN (HCC): ICD-10-CM

## 2022-02-09 DIAGNOSIS — J45.40 MODERATE PERSISTENT ASTHMA WITHOUT COMPLICATION: ICD-10-CM

## 2022-02-09 DIAGNOSIS — Z79.4 TYPE 2 DIABETES MELLITUS WITHOUT COMPLICATION, WITH LONG-TERM CURRENT USE OF INSULIN (HCC): ICD-10-CM

## 2022-02-10 ENCOUNTER — HOSPITAL ENCOUNTER (OUTPATIENT)
Dept: ULTRASOUND IMAGING | Age: 57
Discharge: HOME | End: 2022-02-10
Payer: COMMERCIAL

## 2022-02-10 ENCOUNTER — HOSPITAL ENCOUNTER (OUTPATIENT)
Dept: GENERAL RADIOLOGY | Age: 57
Discharge: HOME | End: 2022-02-10
Payer: COMMERCIAL

## 2022-02-10 ENCOUNTER — TRANSCRIBE ORDER (OUTPATIENT)
Dept: EMERGENCY DEPT | Age: 57
End: 2022-02-10

## 2022-02-10 DIAGNOSIS — M54.42 LOW BACK PAIN WITH LEFT-SIDED SCIATICA: Primary | ICD-10-CM

## 2022-02-10 DIAGNOSIS — M54.42 ACUTE LEFT-SIDED LOW BACK PAIN WITH LEFT-SIDED SCIATICA: ICD-10-CM

## 2022-02-10 DIAGNOSIS — R74.01 TRANSAMINITIS: ICD-10-CM

## 2022-02-10 DIAGNOSIS — E80.6 HYPERBILIRUBINEMIA: ICD-10-CM

## 2022-02-10 DIAGNOSIS — M54.42 LOW BACK PAIN WITH LEFT-SIDED SCIATICA: ICD-10-CM

## 2022-02-10 PROCEDURE — 76705 ECHO EXAM OF ABDOMEN: CPT

## 2022-02-10 PROCEDURE — 72100 X-RAY EXAM L-S SPINE 2/3 VWS: CPT

## 2022-02-10 RX ORDER — FLUTICASONE FUROATE AND VILANTEROL TRIFENATATE 100; 25 UG/1; UG/1
1 POWDER RESPIRATORY (INHALATION) DAILY
Qty: 60 EACH | Refills: 2 | Status: SHIPPED | OUTPATIENT
Start: 2022-02-10

## 2022-02-10 RX ORDER — INSULIN GLARGINE 100 [IU]/ML
INJECTION, SOLUTION SUBCUTANEOUS
Qty: 20 PEN | Refills: 1 | Status: SHIPPED | OUTPATIENT
Start: 2022-02-10

## 2022-02-10 RX ORDER — LISINOPRIL 20 MG/1
20 TABLET ORAL DAILY
Qty: 90 TABLET | Refills: 1 | Status: SHIPPED | OUTPATIENT
Start: 2022-02-10

## 2022-02-10 RX ORDER — CETIRIZINE HCL 10 MG
10 TABLET ORAL DAILY
Qty: 90 TABLET | Refills: 1 | Status: SHIPPED | OUTPATIENT
Start: 2022-02-10

## 2022-02-10 NOTE — PROGRESS NOTES
Please notify pt that US of abdomen is normal. Normal gallbladder. No additional work up indicated at this time.

## 2022-02-15 DIAGNOSIS — M54.42 ACUTE LEFT-SIDED LOW BACK PAIN WITH LEFT-SIDED SCIATICA: ICD-10-CM

## 2022-02-15 DIAGNOSIS — M25.531 ACUTE PAIN OF RIGHT WRIST: ICD-10-CM

## 2022-02-15 RX ORDER — PREDNISONE 10 MG/1
TABLET ORAL
Qty: 1 DOSE PACK | Refills: 0 | Status: SHIPPED | OUTPATIENT
Start: 2022-02-15 | End: 2022-02-17 | Stop reason: SDUPTHER

## 2022-02-17 DIAGNOSIS — M54.42 ACUTE LEFT-SIDED LOW BACK PAIN WITH LEFT-SIDED SCIATICA: ICD-10-CM

## 2022-02-17 DIAGNOSIS — M25.531 ACUTE PAIN OF RIGHT WRIST: ICD-10-CM

## 2022-02-17 RX ORDER — PREDNISONE 10 MG/1
TABLET ORAL
Qty: 1 DOSE PACK | Refills: 0 | Status: SHIPPED | OUTPATIENT
Start: 2022-02-17

## 2022-02-28 ENCOUNTER — DOCUMENTATION ONLY (OUTPATIENT)
Dept: FAMILY MEDICINE CLINIC | Age: 57
End: 2022-02-28

## 2022-03-18 PROBLEM — E11.3293 TYPE 2 DIABETES MELLITUS WITH BOTH EYES AFFECTED BY MILD NONPROLIFERATIVE RETINOPATHY WITHOUT MACULAR EDEMA, WITHOUT LONG-TERM CURRENT USE OF INSULIN (HCC): Status: ACTIVE | Noted: 2021-11-08
